# Patient Record
Sex: FEMALE | Race: WHITE | URBAN - METROPOLITAN AREA
[De-identification: names, ages, dates, MRNs, and addresses within clinical notes are randomized per-mention and may not be internally consistent; named-entity substitution may affect disease eponyms.]

---

## 2018-01-02 ENCOUNTER — HOSPITAL ENCOUNTER (OUTPATIENT)
Dept: PHYSICAL THERAPY | Age: 33
Discharge: HOME | End: 2018-01-02
Payer: COMMERCIAL

## 2018-01-02 PROCEDURE — 97110 THERAPEUTIC EXERCISES: CPT

## 2018-01-02 PROCEDURE — 97161 PT EVAL LOW COMPLEX 20 MIN: CPT

## 2018-01-02 NOTE — PROGRESS NOTES
Ambulatory/Rehab Services H2 Model Falls Risk Assessment    Risk Factor Pts. ·   Confusion/Disorientation/Impulsivity  []    4 ·   Symptomatic Depression  []   2 ·   Altered Elimination  []   1 ·   Dizziness/Vertigo  []   1 ·   Gender (Male)  []   1 ·   Any administered antiepileptics (anticonvulsants):  []   2 ·   Any administered benzodiazepines:  []   1 ·   Visual Impairment (specify):  []   1 ·   Portable Oxygen Use  []   1 ·   Orthostatic ? BP  []   1 ·   History of Recent Falls (within 3 mos.)  []   5     Ability to Rise from Chair (choose one) Pts. ·   Ability to rise in a single movement  [x]   0 ·   Pushes up, successful in one attempt  []   1 ·   Multiple attempts, but successful  []   3 ·   Unable to rise without assistance  []   4   Total: (5 or greater = High Risk) 0     Falls Prevention Plan:   []                Physical Limitations to Exercise (specify):   []                Mobility Assistance Device (type):   []                Exercise/Equipment Adaptation (specify):    ©2010 [facility] Patent #[id number].  Federal Law prohibits the replication, distribution or use without written permission from [facility] [employer]

## 2018-01-02 NOTE — THERAPY EVALUATION
Solis Blackburn  : 1985  Payor: 559Deepa Carvalho / Plan: [address] / Product Type: PPO /  [address] at [facility] [address]  [phone]   [phone]          OUTPATIENT PHYSICAL THERAPY:Initial Assessment and Daily Note 2018    ICD-10: Treatment Diagnosis: Pelvic Floor Dysfunction ( M62.89); Lack of coordination (R27.8); Spasm of muscle (Y48.687)  Precautions/Allergies:   Sulfa (sulfonamide antibiotics)   Fall Risk Score: 0 (? 5 = High Risk)  MD Orders: Evaluate and treat MEDICAL/REFERRING DIAGNOSIS:  Other specified disorders of muscle [M62.89]   DATE OF ONSET: Summer 2017  REFERRING PHYSICIAN: Stephen Schuster MD  RETURN PHYSICIAN APPOINTMENT: TBD     INITIAL ASSESSMENT:  Ms. Mariam Perales presents with moderate-significant pelvic floor muscle (PFM) over activity and lack of coordination, resulting in pain during intercourse. She also demonstrates weakness throughout her core musculature and pelvic girdle, further increasing tone in her PFM. This is contributing to her difficulty with wearing tampons and penile penetration. I believe she will benefit from skilled PT, with an emphasis on manual therapy and muscle retraining, strength, and coordination to improve her ability to perform sexual intercourse without pain or difficulty. She is pleasant, motivated and eager to return to pain free intercourse. PROBLEM LIST (Impacting functional limitations):  1. Decreased Strength  2. Increased Pain  3. Decreased [place] with Home Exercise Program INTERVENTIONS PLANNED:  1. Home Exercise Program (HEP)  2. Manual Therapy  3. Therapeutic Exercise/Strengthening   TREATMENT PLAN:  Effective Dates: 2018 TO 2018.   Frequency/Duration: Once every two weeks for 8 weeks (4 sessions total)    GOALS: (Goals have been discussed and agreed upon with patient.)    Short-Term Functional Goals: Time Frame: 4 weeks  Pt will demonstrate I with basic PFM HEP to improve awareness, coordination, and timing of PFM. Pt with demonstrate normal voluntary relaxation of the pelvic floor muscle group to improve pelvic floor ROM and tolerate pain free tampon use. Discharge Goals: Time Frame: 8 weeks  1. Pt will report < 2/10 discomfort during intercourse, by implementing lubrication, positioning and down training strategies as instructed by PT. 2. Pt will demonstrate I with advanced core stabilization and general mobility program to facilitate carry over and I management of symptoms. 3. Pt will demonstrate appropriate co contraction of the Transversus Abdominus and Pelvic Floor muscle group, and maintain x 60 seconds to improve core stability and return to dynamic physical activity such as 9 rounds, without increasing tension in the PFM. Rehabilitation Potential For Stated Goals: Good                The information in this section was collected on 1/2/2018 (except where otherwise noted). HISTORY:   History of Present Injury/Illness (Reason for Referral):  Pt reports onset of pain during intercourse after being  in Summer 2017. 0 pregnancies. Urinary: Denies urinary dysfunction, including dysuria, hesitancy or incontinence. Does state that there is pain with urination following penetrative intercourse. Bowel: Once every three days without pushing/straining. Sexual: Emilie Major 3 at this time, due to increasing pain with intercourse. Rated as 8/10, described as tight/stretching. Denies pain with gynecological examinations, but does state that wearing tampons is \"uncomfortable, like they are coming out all the time\". Pelvic Organ Prolapse/Pelvic Pain: Denies POP or pelvic girdle pain at this time. Past Medical History/Comorbidities:   Ms. Yessica Graves  has no past medical history on file. Ms. Yessica Graves  has no past surgical history on file. Social History/Living Environment:     Lives with . No physical barriers at this time.   Prior Level of Function/Work/Activity:  Teacher    Current Medications:       Current Outpatient Prescriptions:     tramadol-acetaminophen (ULTRACET) 37.5-325 mg per tablet, Take 1 Tab by mouth every six (6) hours as needed for Pain., Disp: 20 Tab, Rfl: 0    OMEPRAZOLE PO, Take 20 mg by mouth two (2) times a day., Disp: , Rfl:     ondansetron (ZOFRAN ODT) 8 mg disintegrating tablet, Take 8 mg by mouth every eight (8) hours as needed. , Disp: , Rfl:    Date Last Reviewed:  2018   Number of Personal Factors/Comorbidities that affect the Plan of Care: 0: LOW COMPLEXITY   EXAMINATION:   Palpation:          Via vaginal canal: R STP and ischiocavernosus tender to palpation (with slight pull/burn noted), as well as L ischiocavernosus. Pubococcygeus locally TTP with pressure and \"poking\", described as uncomfortable, as well as iliococcygeus B. All improved with Strain Counterstrain (SCS)  Strength:  Via vaginal canal- P: Power, E: Endurance, R: Repetitions, QF: Quick Flicks, TrA: Transverse Abdominus, DB: Diaphragmatic Breathing  P At least 3/5 with moderate delay in relaxation, but unable to fully assess due to overactivity. E   NT due to active tenderness in PFM   R   NT due to active tenderness in PFM   QF   NT due to active tenderness in PFM   TrA Strong, with strong PFM co contraction. DB Impaired, chest breathing;l however some descent of PFM noted. Coordination:          Via SEMG. Supine resting tone in hooklyin-7 mV, with PFM drops, 3-5 mV. Following adductor CTM < 1.2 mV     Body Structures Involved:  1. Muscles Body Functions Affected:  1. Neuromusculoskeletal  2. Movement Related Activities and Participation Affected:  1. Self Care  2. Interpersonal Interactions and Relationships   Number of elements (examined above) that affect the Plan of Care: 3: MODERATE COMPLEXITY   CLINICAL PRESENTATION:   Presentation: Stable and uncomplicated: LOW COMPLEXITY   CLINICAL DECISION MAKING:   Outcome Measure:    Tool Used: pain Disability index  Score:  Initial: 1/2/2018- 8/10 (Sexual Behavior) Most Recent: TBD   Interpretation of Score: The rating scale measures the degree to which aspects of your life are disrupted by chronic pain. For each of the 7 categories, patient circles the level of disability they experience 0 to 10. 0 signifies no disability at all, 10 signifies that these activities are totally disrupted by pain. Medical Necessity:   · Patient is expected to demonstrate progress in strength, coordination and functional technique to improve PFM function and eliminate dyspareunia. Reason for Services/Other Comments:  · Patient continues to require skilled intervention due to above mentioned deficits. Use of outcome tool(s) and clinical judgement create a POC that gives a: Clear prediction of patient's progress: LOW COMPLEXITY            TREATMENT:   (In addition to Assessment/Re-Assessment sessions the following treatments were rendered)    Pre-treatment Symptoms/Complaints:  Pt is eager and nervous to start PT. Pain: Initial:   Pain Intensity 1: 0/10 Post Session:  1/10 (mild discomfort vaginally, similar to postcoital pain)     THERAPEUTIC EXERCISE: (15 minutes):  Exercises per grid below to improve mobility and coordination. Required maximal visual, verbal and tactile cues to promote normal PFM function. Progressed range, repetitions and complexity of movement as indicated. Date:  1/2/2018     Activity/Exercise Parameters   Pelvic Floor Drops 10 minutes   Lubrication and Sexual Positioning 5 minutes   Home Exercise Program Drops 3 x daily     Treatment/Session Assessment:    · Response to Treatment:  Pt is an excellent candidate for skilled PT with improvements in PFM following first session. · Compliance with Program/Exercises: Will assess as treatment progresses. · Recommendations/Intent for next treatment session:  \"Next visit will focus on pelvic mobility, adductor flexibility, initaite dilators\".     Total Treatment Duration: Evaluation 35 minutes, Treatment 15 minutes, French/doff clothing 5 minutes  PT Patient Time In/Time Out  Time In: 1110  Time Out: Yaquelin 24, PT

## 2018-01-15 ENCOUNTER — HOSPITAL ENCOUNTER (OUTPATIENT)
Dept: PHYSICAL THERAPY | Age: 33
Discharge: HOME | End: 2018-01-15
Payer: COMMERCIAL

## 2018-01-15 PROCEDURE — 97140 MANUAL THERAPY 1/> REGIONS: CPT

## 2018-01-15 PROCEDURE — 97110 THERAPEUTIC EXERCISES: CPT

## 2018-01-15 NOTE — PROGRESS NOTES
Esha Getting  : 1985  Payor: Mavis Carvalho / Plan: [address] / Product Type: PPO /  [address] at [facility] [address]  [phone]   [phone]          OUTPATIENT PHYSICAL THERAPY:Daily Note 1/15/2018    ICD-10: Treatment Diagnosis: Pelvic Floor Dysfunction ( M62.89); Lack of coordination (R27.8); Spasm of muscle (G17.867)  Precautions/Allergies:   Sulfa (sulfonamide antibiotics)   Fall Risk Score: 0 (? 5 = High Risk)  MD Orders: Evaluate and treat MEDICAL/REFERRING DIAGNOSIS:  Other specified disorders of muscle [M62.89]   DATE OF ONSET: Summer 2017  REFERRING PHYSICIAN: Reina Bedolla MD  RETURN PHYSICIAN APPOINTMENT: TBD     INITIAL ASSESSMENT:  Ms. Kris Matamoros presents with moderate-significant pelvic floor muscle (PFM) over activity and lack of coordination, resulting in pain during intercourse. She also demonstrates weakness throughout her core musculature and pelvic girdle, further increasing tone in her PFM. This is contributing to her difficulty with wearing tampons and penile penetration. I believe she will benefit from skilled PT, with an emphasis on manual therapy and muscle retraining, strength, and coordination to improve her ability to perform sexual intercourse without pain or difficulty. She is pleasant, motivated and eager to return to pain free intercourse. PROBLEM LIST (Impacting functional limitations):  1. Decreased Strength  2. Increased Pain  3. Decreased [place] with Home Exercise Program INTERVENTIONS PLANNED:  1. Home Exercise Program (HEP)  2. Manual Therapy  3. Therapeutic Exercise/Strengthening   TREATMENT PLAN:  Effective Dates: 2018 TO 2018.   Frequency/Duration: Once every two weeks for 8 weeks (4 sessions total)    GOALS: (Goals have been discussed and agreed upon with patient.)    Short-Term Functional Goals: Time Frame: 4 weeks  Pt will demonstrate I with basic PFM HEP to improve awareness, coordination, and timing of PFM. Pt with demonstrate normal voluntary relaxation of the pelvic floor muscle group to improve pelvic floor ROM and tolerate pain free tampon use. Discharge Goals: Time Frame: 8 weeks  1. Pt will report < 2/10 discomfort during intercourse, by implementing lubrication, positioning and down training strategies as instructed by PT. 2. Pt will demonstrate I with advanced core stabilization and general mobility program to facilitate carry over and I management of symptoms. 3. Pt will demonstrate appropriate co contraction of the Transversus Abdominus and Pelvic Floor muscle group, and maintain x 60 seconds to improve core stability and return to dynamic physical activity such as 9 rounds, without increasing tension in the PFM. Rehabilitation Potential For Stated Goals: Good                The information in this section was collected on 1/15/2018 (except where otherwise noted). HISTORY:   History of Present Injury/Illness (Reason for Referral):  Pt reports onset of pain during intercourse after being  in Summer 2017. 0 pregnancies. Urinary: Denies urinary dysfunction, including dysuria, hesitancy or incontinence. Does state that there is pain with urination following penetrative intercourse. Bowel: Once every three days without pushing/straining. Sexual: Dewain Leeks 3 at this time, due to increasing pain with intercourse. Rated as 8/10, described as tight/stretching. Denies pain with gynecological examinations, but does state that wearing tampons is \"uncomfortable, like they are coming out all the time\". Pelvic Organ Prolapse/Pelvic Pain: Denies POP or pelvic girdle pain at this time. Past Medical History/Comorbidities:   Ms. Mariam Perales  has no past medical history on file. Ms. Mariam Perales  has no past surgical history on file. Social History/Living Environment:     Lives with . No physical barriers at this time.   Prior Level of Function/Work/Activity:  Teacher    Current Medications:       Current Outpatient Prescriptions:     tramadol-acetaminophen (ULTRACET) 37.5-325 mg per tablet, Take 1 Tab by mouth every six (6) hours as needed for Pain., Disp: 20 Tab, Rfl: 0    OMEPRAZOLE PO, Take 20 mg by mouth two (2) times a day., Disp: , Rfl:     ondansetron (ZOFRAN ODT) 8 mg disintegrating tablet, Take 8 mg by mouth every eight (8) hours as needed. , Disp: , Rfl:    Date Last Reviewed:  1/15/2018   Number of Personal Factors/Comorbidities that affect the Plan of Care: 0: LOW COMPLEXITY   EXAMINATION:   Palpation:          Via vaginal canal: R STP and ischiocavernosus tender to palpation (with slight pull/burn noted), as well as L ischiocavernosus. Pubococcygeus locally TTP with pressure and \"poking\", described as uncomfortable, as well as iliococcygeus B. All improved with Strain Counterstrain (SCS)  Strength:  Via vaginal canal- P: Power, E: Endurance, R: Repetitions, QF: Quick Flicks, TrA: Transverse Abdominus, DB: Diaphragmatic Breathing  P At least 3/5 with moderate delay in relaxation, but unable to fully assess due to overactivity. E   NT due to active tenderness in PFM   R   NT due to active tenderness in PFM   QF   NT due to active tenderness in PFM   TrA Strong, with strong PFM co contraction. DB Impaired, chest breathing;l however some descent of PFM noted. Coordination:          Via SEMG. Supine resting tone in hooklyin-7 mV, with PFM drops, 3-5 mV. Following adductor CTM < 1.2 mV     Body Structures Involved:  1. Muscles Body Functions Affected:  1. Neuromusculoskeletal  2. Movement Related Activities and Participation Affected:  1. Self Care  2. Interpersonal Interactions and Relationships   Number of elements (examined above) that affect the Plan of Care: 3: MODERATE COMPLEXITY   CLINICAL PRESENTATION:   Presentation: Stable and uncomplicated: LOW COMPLEXITY   CLINICAL DECISION MAKING:   Outcome Measure:    Tool Used: pain Disability index  Score:  Initial: 1/2/2018- 8/10 (Sexual Behavior) Most Recent: TBD   Interpretation of Score: The rating scale measures the degree to which aspects of your life are disrupted by chronic pain. For each of the 7 categories, patient circles the level of disability they experience 0 to 10. 0 signifies no disability at all, 10 signifies that these activities are totally disrupted by pain. Medical Necessity:   · Patient is expected to demonstrate progress in strength, coordination and functional technique to improve PFM function and eliminate dyspareunia. Reason for Services/Other Comments:  · Patient continues to require skilled intervention due to above mentioned deficits. Use of outcome tool(s) and clinical judgement create a POC that gives a: Clear prediction of patient's progress: LOW COMPLEXITY            TREATMENT:   (In addition to Assessment/Re-Assessment sessions the following treatments were rendered)    Pre-treatment Symptoms/Complaints:  No soreness noted following initial evaluation. Still having a hard time feeling those muscles down below, otherwise no complaints. Pain: Initial:   Pain Intensity 1: 0/10 Post Session: 0/10     THERAPEUTIC EXERCISE: (25 minutes):  Exercises per grid below to improve mobility and coordination. Required moderate visual, verbal and tactile cues to promote normal PFM function. Progressed range, repetitions and complexity of movement as indicated. Date:  1/15/2018     Activity/Exercise Parameters   Pelvic Floor Drops 12 minutes   Arousal Sheet Music, Female arousal continuum; 5 minutes   Dilators Rationale, Instructions; 5 minutes   Cat/Camel/Child's Pose 2 minutes   Home Exercise Program Drops 3 x daily, Cat/Camel/Child's pose 1 x daily; 1 minute     MANUAL THERAPY: (30 minutes): Soft tissue mobilization was utilized and necessary because of the patient's painful spasm and restricted motion of soft tissue.   (Used abbreviations: MET - muscle energy technique; SCS- Strain counter strain; CTM- Connective tissue mobilizations; CR- Contract/relax; SP- Sustained pressure, TrP- Trigger point release, IASTM- Instrument assisted soft tissue mobilizations, TDN- Trigger point dry needling). - SP, CR, and SCS utilized to PFM with 1 finger and small/medium dilator. F/B CTM to B adductor group. Treatment/Session Assessment:    · Response to Treatment:  Resting tone elevated 5-7 mV at start of session, but eventually WNL during internal. Mostly uncomfortable at superficial PFM and tolerated small vaginismus dilator without pain. Medium did illicit some reproduction of the need to urinate following intercourse. Significant emphasis on education on arousal and desire. · Compliance with Program/Exercises: Will assess as treatment progresses. · Recommendations/Intent for next treatment session: \"Next visit will focus on medium, large dilator, superficial emphasis\".     Total Treatment Duration: 55 minutes, French/doff clothing 5 minutes  PT Patient Time In/Time Out  Time In: 0800  Time Out: 0900    Mili Rockwell, PT

## 2018-01-16 ENCOUNTER — APPOINTMENT (OUTPATIENT)
Dept: PHYSICAL THERAPY | Age: 33
End: 2018-01-16
Payer: COMMERCIAL

## 2018-01-29 ENCOUNTER — HOSPITAL ENCOUNTER (OUTPATIENT)
Dept: PHYSICAL THERAPY | Age: 33
Discharge: HOME | End: 2018-01-29
Payer: COMMERCIAL

## 2018-01-29 NOTE — PROGRESS NOTES
Mansoor Merino  : 1985  Primary: [facility]  Secondary:  [address] at [address]  [phone]   [phone]          OUTPATIENT DAILY NOTE    NAME/AGE/GENDER: Mansoor Merino is a 34 y.o. female. DATE: 2018    SUBJECTIVE:  Today's appt cancelled due to therapist scheduling conflict. Will plan to follow up on next scheduled visit.     Ursula Macias PT,

## 2018-01-30 ENCOUNTER — APPOINTMENT (OUTPATIENT)
Dept: PHYSICAL THERAPY | Age: 33
End: 2018-01-30
Payer: COMMERCIAL

## 2018-01-31 ENCOUNTER — HOSPITAL ENCOUNTER (OUTPATIENT)
Dept: PHYSICAL THERAPY | Age: 33
Discharge: HOME | End: 2018-01-31
Payer: COMMERCIAL

## 2018-01-31 PROCEDURE — 97140 MANUAL THERAPY 1/> REGIONS: CPT

## 2018-01-31 PROCEDURE — 97110 THERAPEUTIC EXERCISES: CPT

## 2018-01-31 NOTE — PROGRESS NOTES
Rafi Tadeo  : 1985  [facility] / Plan: [address] / Product Type: PPO /  [address]  at [facility] [address]  [phone]   [phone]          OUTPATIENT PHYSICAL THERAPY:Daily Note 2018    ICD-10: Treatment Diagnosis: Pelvic Floor Dysfunction ( M62.89); Lack of coordination (R27.8); Spasm of muscle (R01.924)  Precautions/Allergies:   Sulfa (sulfonamide antibiotics)   Fall Risk Score: 0 (? 5 = High Risk)  MD Orders: Evaluate and treat MEDICAL/REFERRING DIAGNOSIS:  Other specified disorders of muscle [M62.89]   DATE OF ONSET: Summer 2017  REFERRING PHYSICIAN: Felicie Severe, MD  RETURN PHYSICIAN APPOINTMENT: TBD     INITIAL ASSESSMENT:  Ms. Chris Wright presents with moderate-significant pelvic floor muscle (PFM) over activity and lack of coordination, resulting in pain during intercourse. She also demonstrates weakness throughout her core musculature and pelvic girdle, further increasing tone in her PFM. This is contributing to her difficulty with wearing tampons and penile penetration. I believe she will benefit from skilled PT, with an emphasis on manual therapy and muscle retraining, strength, and coordination to improve her ability to perform sexual intercourse without pain or difficulty. She is pleasant, motivated and eager to return to pain free intercourse. PROBLEM LIST (Impacting functional limitations):  1. Decreased Strength  2. Increased Pain  3. Decreased [place] with Home Exercise Program INTERVENTIONS PLANNED:  1. Home Exercise Program (HEP)  2. Manual Therapy  3. Therapeutic Exercise/Strengthening   TREATMENT PLAN:  Effective Dates: 2018 TO 2018.   Frequency/Duration: Once every two weeks for 8 weeks (4 sessions total)    GOALS: (Goals have been discussed and agreed upon with patient.)    Short-Term Functional Goals: Time Frame: 4 weeks  Pt will demonstrate I with basic PFM HEP to improve awareness, coordination, and timing of PFM. (MET 1/31/2018)  Pt with demonstrate normal voluntary relaxation of the pelvic floor muscle group to improve pelvic floor ROM and tolerate pain free tampon use. Discharge Goals: Time Frame: 8 weeks  1. Pt will report < 2/10 discomfort during intercourse, by implementing lubrication, positioning and down training strategies as instructed by PT. 2. Pt will demonstrate I with advanced core stabilization and general mobility program to facilitate carry over and I management of symptoms. 3. Pt will demonstrate appropriate co contraction of the Transversus Abdominus and Pelvic Floor muscle group, and maintain x 60 seconds to improve core stability and return to dynamic physical activity such as 9 rounds, without increasing tension in the PFM. Rehabilitation Potential For Stated Goals: Good                The information in this section was collected on 1/31/2018 (except where otherwise noted). HISTORY:   History of Present Injury/Illness (Reason for Referral):  Pt reports onset of pain during intercourse after being  in Summer 2017. 0 pregnancies. Urinary: Denies urinary dysfunction, including dysuria, hesitancy or incontinence. Does state that there is pain with urination following penetrative intercourse. Bowel: Once every three days without pushing/straining. Sexual: Marcos Saleem 3 at this time, due to increasing pain with intercourse. Rated as 8/10, described as tight/stretching. Denies pain with gynecological examinations, but does state that wearing tampons is \"uncomfortable, like they are coming out all the time\". Pelvic Organ Prolapse/Pelvic Pain: Denies POP or pelvic girdle pain at this time. Past Medical History/Comorbidities:   Ms. Whitley Martinez  has no past medical history on file. Ms. Whitley Martinez  has no past surgical history on file. Social History/Living Environment:     Lives with . No physical barriers at this time.   Prior Level of Function/Work/Activity:  Teacher    Current Medications:       Current Outpatient Prescriptions:     tramadol-acetaminophen (ULTRACET) 37.5-325 mg per tablet, Take 1 Tab by mouth every six (6) hours as needed for Pain., Disp: 20 Tab, Rfl: 0    OMEPRAZOLE PO, Take 20 mg by mouth two (2) times a day., Disp: , Rfl:     ondansetron (ZOFRAN ODT) 8 mg disintegrating tablet, Take 8 mg by mouth every eight (8) hours as needed. , Disp: , Rfl:    Date Last Reviewed:  2018   Number of Personal Factors/Comorbidities that affect the Plan of Care: 0: LOW COMPLEXITY   EXAMINATION:   Palpation:          Via vaginal canal: R STP and ischiocavernosus tender to palpation (with slight pull/burn noted), as well as L ischiocavernosus. Pubococcygeus locally TTP with pressure and \"poking\", described as uncomfortable, as well as iliococcygeus B. All improved with Strain Counterstrain (SCS)  Strength:  Via vaginal canal- P: Power, E: Endurance, R: Repetitions, QF: Quick Flicks, TrA: Transverse Abdominus, DB: Diaphragmatic Breathing  P At least 3/5 with moderate delay in relaxation, but unable to fully assess due to overactivity. E   NT due to active tenderness in PFM   R   NT due to active tenderness in PFM   QF   NT due to active tenderness in PFM   TrA Strong, with strong PFM co contraction. DB Impaired, chest breathing;l however some descent of PFM noted. Coordination:          Via SEMG. Supine resting tone in hooklyin-7 mV, with PFM drops, 3-5 mV. Following adductor CTM < 1.2 mV     Body Structures Involved:  1. Muscles Body Functions Affected:  1. Neuromusculoskeletal  2. Movement Related Activities and Participation Affected:  1. Self Care  2. Interpersonal Interactions and Relationships   Number of elements (examined above) that affect the Plan of Care: 3: MODERATE COMPLEXITY   CLINICAL PRESENTATION:   Presentation: Stable and uncomplicated: LOW COMPLEXITY   CLINICAL DECISION MAKING:   Outcome Measure:    Tool Used: pain Disability index  Score:  Initial: 1/2/2018- 8/10 (Sexual Behavior) Most Recent: TBD   Interpretation of Score: The rating scale measures the degree to which aspects of your life are disrupted by chronic pain. For each of the 7 categories, patient circles the level of disability they experience 0 to 10. 0 signifies no disability at all, 10 signifies that these activities are totally disrupted by pain. Medical Necessity:   · Patient is expected to demonstrate progress in strength, coordination and functional technique to improve PFM function and eliminate dyspareunia. Reason for Services/Other Comments:  · Patient continues to require skilled intervention due to above mentioned deficits. Use of outcome tool(s) and clinical judgement create a POC that gives a: Clear prediction of patient's progress: LOW COMPLEXITY            TREATMENT:   (In addition to Assessment/Re-Assessment sessions the following treatments were rendered)    Pre-treatment Symptoms/Complaints:  No pain following last session. Did have intercourse and pt did not report deep pain, however superficial burning near the urethra. Did resolve as intercourse continued, but not comfortable. Has not purchased dilators at this time, but did start reading Sheet Music. Pain: Initial:   Pain Intensity 1: 0/10 Post Session: 0/10     THERAPEUTIC EXERCISE: (25 minutes):  Exercises per grid below to improve mobility and coordination. Required moderate visual, verbal and tactile cues to promote normal PFM function. Progressed range, repetitions and complexity of movement as indicated. Date:  1/31/2018     Activity/Exercise Parameters   Pelvic Floor Drops 10 minutes   Arousal/Desrire Education 5 minutes   Dilators/Self Stretch Review, Instructions; 5 minutes   Happy Baby 2 minutes   Adductor stretch 2 minutes   Home Exercise Program Drops 3 x daily, Cat/Camel/Child's pose 1 x daily; 1 minute     MANUAL THERAPY: (30 minutes):  Soft tissue mobilization was utilized and necessary because of the patient's painful spasm and restricted motion of soft tissue. (Used abbreviations: MET - muscle energy technique; SCS- Strain counter strain; CTM- Connective tissue mobilizations; CR- Contract/relax; SP- Sustained pressure, TrP- Trigger point release, IASTM- Instrument assisted soft tissue mobilizations, TDN- Trigger point dry needling). - SP, CR, and SCS utilized to PFM with 1 finger and large dilator.   - CTM and IASTM to B adductor group. Treatment/Session Assessment:    · Response to Treatment:  Qtip test 1 at 1:00, as well as tightness and discomfort at L ischiocavernosus. Improved with MT. Deep PFM nontender this date and utilized large dilator without resistance, but mild discomfort. Progressing very well and nearing achievement of DTG 1. Discussed self options to treat PFM, including dilators or 1 finger stretch. · Compliance with Program/Exercises: Complaint with drops at this time. Achieved STG 1     · Recommendations/Intent for next treatment session: \"Next visit will focus on Large/XL dilator, superficial emphasis\".     Total Treatment Duration: 55 minutes, French/doff clothing 5 minutes  PT Patient Time In/Time Out  Time In: 2773  Time Out: [facility],

## 2018-02-12 ENCOUNTER — HOSPITAL ENCOUNTER (OUTPATIENT)
Dept: PHYSICAL THERAPY | Age: 33
Discharge: HOME | End: 2018-02-12
Payer: COMMERCIAL

## 2018-02-12 PROCEDURE — 97140 MANUAL THERAPY 1/> REGIONS: CPT

## 2018-02-12 PROCEDURE — 97110 THERAPEUTIC EXERCISES: CPT

## 2018-02-12 NOTE — PROGRESS NOTES
Adria Bolton  : 1985  Payor: Mavis Carvalho / Plan: [address] / Product Type: PPO /  [address] at [address]  [phone]   [phone]          OUTPATIENT PHYSICAL THERAPY:Daily Note 2018    ICD-10: Treatment Diagnosis: Pelvic Floor Dysfunction ( M62.89); Lack of coordination (R27.8); Spasm of muscle (D15.821)  Precautions/Allergies:   Sulfa (sulfonamide antibiotics)   Fall Risk Score: 0 (? 5 = High Risk)  MD Orders: Evaluate and treat MEDICAL/REFERRING DIAGNOSIS:  Other specified disorders of muscle [M62.89]   DATE OF ONSET: Summer 2017  REFERRING PHYSICIAN: Jeancarlos Garcia MD  RETURN PHYSICIAN APPOINTMENT: TBD     INITIAL ASSESSMENT:  Ms. Mili Adamson presents with moderate-significant pelvic floor muscle (PFM) over activity and lack of coordination, resulting in pain during intercourse. She also demonstrates weakness throughout her core musculature and pelvic girdle, further increasing tone in her PFM. This is contributing to her difficulty with wearing tampons and penile penetration. I believe she will benefit from skilled PT, with an emphasis on manual therapy and muscle retraining, strength, and coordination to improve her ability to perform sexual intercourse without pain or difficulty. She is pleasant, motivated and eager to return to pain free intercourse. PROBLEM LIST (Impacting functional limitations):  1. Decreased Strength  2. Increased Pain  3. Decreased [place] with Home Exercise Program INTERVENTIONS PLANNED:  1. Home Exercise Program (HEP)  2. Manual Therapy  3. Therapeutic Exercise/Strengthening   TREATMENT PLAN:  Effective Dates: 2018 TO 2018.   Frequency/Duration: Once every two weeks for 8 weeks (4 sessions total)    GOALS: (Goals have been discussed and agreed upon with patient.)    Short-Term Functional Goals: Time Frame: 4 weeks  Pt will demonstrate I with basic PFM HEP to improve awareness, coordination, and timing of PFM. (MET 1/31/2018)  Pt with demonstrate normal voluntary relaxation of the pelvic floor muscle group to improve pelvic floor ROM and tolerate pain free tampon use. (MET 2/12/2018)    Discharge Goals: Time Frame: 8 weeks  1. Pt will report < 2/10 discomfort during intercourse, by implementing lubrication, positioning and down training strategies as instructed by PT. 2. Pt will demonstrate I with advanced core stabilization and general mobility program to facilitate carry over and I management of symptoms. 3. Pt will demonstrate appropriate co contraction of the Transversus Abdominus and Pelvic Floor muscle group, and maintain x 60 seconds to improve core stability and return to dynamic physical activity such as 9 rounds, without increasing tension in the PFM. Rehabilitation Potential For Stated Goals: Good                The information in this section was collected on 2/12/2018 (except where otherwise noted). HISTORY:   History of Present Injury/Illness (Reason for Referral):  Pt reports onset of pain during intercourse after being  in Summer 2017. 0 pregnancies. Urinary: Denies urinary dysfunction, including dysuria, hesitancy or incontinence. Does state that there is pain with urination following penetrative intercourse. Bowel: Once every three days without pushing/straining. Sexual: J Luis rPince 3 at this time, due to increasing pain with intercourse. Rated as 8/10, described as tight/stretching. Denies pain with gynecological examinations, but does state that wearing tampons is \"uncomfortable, like they are coming out all the time\". Pelvic Organ Prolapse/Pelvic Pain: Denies POP or pelvic girdle pain at this time. Past Medical History/Comorbidities:   Ms. Jang Courser  has no past medical history on file. Ms. Jang Coursemarek  has no past surgical history on file. Social History/Living Environment:     Lives with . No physical barriers at this time.   Prior Level of Function/Work/Activity:  Teacher    Current Medications:       Current Outpatient Prescriptions:     tramadol-acetaminophen (ULTRACET) 37.5-325 mg per tablet, Take 1 Tab by mouth every six (6) hours as needed for Pain., Disp: 20 Tab, Rfl: 0    OMEPRAZOLE PO, Take 20 mg by mouth two (2) times a day., Disp: , Rfl:     ondansetron (ZOFRAN ODT) 8 mg disintegrating tablet, Take 8 mg by mouth every eight (8) hours as needed. , Disp: , Rfl:    Date Last Reviewed:  2018   Number of Personal Factors/Comorbidities that affect the Plan of Care: 0: LOW COMPLEXITY   EXAMINATION:   Palpation:          Via vaginal canal: R STP and ischiocavernosus tender to palpation (with slight pull/burn noted), as well as L ischiocavernosus. Pubococcygeus locally TTP with pressure and \"poking\", described as uncomfortable, as well as iliococcygeus B. All improved with Strain Counterstrain (SCS)  Strength:  Via vaginal canal- P: Power, E: Endurance, R: Repetitions, QF: Quick Flicks, TrA: Transverse Abdominus, DB: Diaphragmatic Breathing  P At least 3/5 with moderate delay in relaxation, but unable to fully assess due to overactivity. E   NT due to active tenderness in PFM   R   NT due to active tenderness in PFM   QF   NT due to active tenderness in PFM   TrA Strong, with strong PFM co contraction. DB Impaired, chest breathing;l however some descent of PFM noted. Coordination:          Via SEMG. Supine resting tone in hooklyin-7 mV, with PFM drops, 3-5 mV. Following adductor CTM < 1.2 mV     Body Structures Involved:  1. Muscles Body Functions Affected:  1. Neuromusculoskeletal  2. Movement Related Activities and Participation Affected:  1. Self Care  2.  Interpersonal Interactions and Relationships   Number of elements (examined above) that affect the Plan of Care: 3: MODERATE COMPLEXITY   CLINICAL PRESENTATION:   Presentation: Stable and uncomplicated: LOW COMPLEXITY   CLINICAL DECISION MAKING:   Outcome Measure: Tool Used: pain Disability index  Score:  Initial: 1/2/2018- 8/10 (Sexual Behavior) Most Recent: TBD   Interpretation of Score: The rating scale measures the degree to which aspects of your life are disrupted by chronic pain. For each of the 7 categories, patient circles the level of disability they experience 0 to 10. 0 signifies no disability at all, 10 signifies that these activities are totally disrupted by pain. Medical Necessity:   · Patient is expected to demonstrate progress in strength, coordination and functional technique to improve PFM function and eliminate dyspareunia. Reason for Services/Other Comments:  · Patient continues to require skilled intervention due to above mentioned deficits. Use of outcome tool(s) and clinical judgement create a POC that gives a: Clear prediction of patient's progress: LOW COMPLEXITY            TREATMENT:   (In addition to Assessment/Re-Assessment sessions the following treatments were rendered)    Pre-treatment Symptoms/Complaints:  Sick within the past two weeks, but did try to have intercourse. Rated as 6/10 and not superficial. This felt like deep mm. Pain, that she could not relax. Pain: Initial:   Pain Intensity 1: 0/10 Post Session: 0/10     THERAPEUTIC EXERCISE: (40 minutes):  Exercises per grid below to improve mobility and coordination. Required minimal visual, verbal and tactile cues to promote normal PFM function. Progressed range, repetitions and complexity of movement as indicated. Date:  2/12/2018     Activity/Exercise Parameters   Pelvic Floor Drops 10 minutes (w/semg, in supine)   Arousal/Desrire Education 4 minutes   Dilators/Self Stretch Proper performance, Mod A w/ large dilator; 25 minutes   Happy Baby    Adductor stretch    Home Exercise Program Drops 3 x daily, Cat/Camel/Child's pose 1 x daily, Add Dilators; 1 minute     MANUAL THERAPY: (15 minutes):  Soft tissue mobilization was utilized and necessary because of the patient's painful spasm and restricted motion of soft tissue. (Used abbreviations: MET - muscle energy technique; SCS- Strain counter strain; CTM- Connective tissue mobilizations; CR- Contract/relax; SP- Sustained pressure, TrP- Trigger point release, IASTM- Instrument assisted soft tissue mobilizations, TDN- Trigger point dry needling). - SP, CR, and SCS utilized to PFM with 1 finger, large and XL dilator. Treatment/Session Assessment:    · Response to Treatment:  No pain with 1 finger, L or Xl dilator. Significant emphasis on self performance to maintain gains that are being made in PT. Patient is progressing well, and I believe she is starting to understand the role her mind/values/beliefs are playing on her tension/tightness during intercourse. Patient has achieved STG 2 at this time, demonstrating normal voluntary relaxation at start of session. · Compliance with Program/Exercises: Complaint with drops at this time. · Recommendations/Intent for next treatment session: \"Next visit will focus on Large/XL dilator, superficial emphasis\".     Total Treatment Duration: 55 minutes, French/doff clothing 5 minutes  PT Patient Time In/Time Out  Time In: 7159  Time Out: [address], PT

## 2018-02-13 ENCOUNTER — APPOINTMENT (OUTPATIENT)
Dept: PHYSICAL THERAPY | Age: 33
End: 2018-02-13
Payer: COMMERCIAL

## 2018-02-26 ENCOUNTER — APPOINTMENT (OUTPATIENT)
Dept: PHYSICAL THERAPY | Age: 33
End: 2018-02-26
Payer: COMMERCIAL

## 2018-02-27 ENCOUNTER — APPOINTMENT (OUTPATIENT)
Dept: PHYSICAL THERAPY | Age: 33
End: 2018-02-27
Payer: COMMERCIAL

## 2018-03-07 ENCOUNTER — HOSPITAL ENCOUNTER (OUTPATIENT)
Dept: PHYSICAL THERAPY | Age: 33
Discharge: HOME | End: 2018-03-07
Payer: COMMERCIAL

## 2018-03-07 PROCEDURE — 97140 MANUAL THERAPY 1/> REGIONS: CPT

## 2018-03-07 PROCEDURE — 97110 THERAPEUTIC EXERCISES: CPT

## 2018-03-07 NOTE — THERAPY RECERTIFICATION
[place]  : 1985  Payor: Mavis Carvalho / Plan: [id number] / Product Type: PPO /  [address]  at [facility]  [address] [place] [address], [address] [place] [address]  [phone]   [phone]          OUTPATIENT PHYSICAL THERAPY:Daily Note, Re-evaluation and Recertification [id number]    ICD-10: Treatment Diagnosis: Pelvic Floor Dysfunction ( M62.89); Lack of coordination (R27.8); Spasm of muscle (R81.961)  Precautions/Allergies:   Sulfa (sulfonamide antibiotics)   Fall Risk Score: 0 (? 5 = High Risk)  MD Orders: Evaluate and treat MEDICAL/REFERRING DIAGNOSIS:  Other specified disorders of muscle [M62.89]   DATE OF ONSET: Summer 2017  REFERRING PHYSICIAN: Augustine Nicolas MD  RETURN PHYSICIAN APPOINTMENT: TBD     RE-EVALUATION/RE-CERTIFICATION ASSESSMENT 3/7/2018: Jessica Caba has been seen for a total of 5 PT sessions with an emphasis on internal and external manual therapy (MT), as well as biofeedback, pelvic floor down training and gentle mobility/flexibility. She has made great gains in re: to her tolerance for internal interventions, even tolerating the XL dilator with < 3/10 pain. She also has improved her ability to voluntarily relax her PFM group, which has also improved her tolerance for attempts at penetrative intercourse. She has had sex 3-4 times since starting PT and does continue to report pain, though the severity has much improved. She believes that a large part of her pain during intercourse can be attributed to her lack of desire/arousal and fear/guarding. Because of this, I think she will benefit from continued PT for internal MT to improve PFM ROM, but also desensitization, but 1 x monthly. She also has been provided with other resources for desire/arousal/psychological component. She is pleasant and motivated, but limitations do include her low libido/desire for intercourse.      INITIAL ASSESSMENT:  Ms. Candi Brand presents with moderate-significant pelvic floor muscle (PFM) over activity and lack of coordination, resulting in pain during intercourse. She also demonstrates weakness throughout her core musculature and pelvic girdle, further increasing tone in her PFM. This is contributing to her difficulty with wearing tampons and penile penetration. I believe she will benefit from skilled PT, with an emphasis on manual therapy and muscle retraining, strength, and coordination to improve her ability to perform sexual intercourse without pain or difficulty. She is pleasant, motivated and eager to return to pain free intercourse. PROBLEM LIST (Impacting functional limitations):  1. Decreased Strength  2. Increased Pain  3. Decreased [place] with Home Exercise Program INTERVENTIONS PLANNED:  1. Home Exercise Program (HEP)  2. Manual Therapy  3. Therapeutic Exercise/Strengthening   TREATMENT PLAN:  Effective Dates: 3/7/2018 TO 5/30/2018. Frequency/Duration: 1 x monthly for 3 months    GOALS: (Goals have been discussed and agreed upon with patient.)    Short-Term Functional Goals:   Pt will demonstrate I with basic PFM HEP to improve awareness, coordination, and timing of PFM. (MET 1/31/2018)  Pt with demonstrate normal voluntary relaxation of the pelvic floor muscle group to improve pelvic floor ROM and tolerate pain free tampon use. (MET 2/12/2018)    Discharge Goals: Time Frame: 12 weeks  1. Pt will report < 2/10 discomfort during intercourse, by implementing lubrication, positioning and down training strategies as instructed by PT. (ONGOING)  2. Pt will demonstrate I with advanced core stabilization and general mobility program to facilitate carry over and I management of symptoms. (ONGOING)  3. Pt will demonstrate appropriate co contraction of the Transversus Abdominus and Pelvic Floor muscle group, and maintain x 60 seconds to improve core stability and return to dynamic physical activity such as 9 rounds, without increasing tension in the PFM. (ONGOING)  4.  Pt will tolerate XL dilator with 0/10 discomfort or pain to initiate painfree intercourse. (NEW)    Rehabilitation Potential For Stated Goals: Good    Regarding Tej Villaseñor therapy, I certify that the treatment plan above will be carried out by a therapist or under their direction. Thank you for this referral,  Maximo Saleh, PT       Referring Physician Signature: Errol Simpson MD              Date                       The information in this section was collected on 3/7/2018 (except where otherwise noted). HISTORY:   History of Present Injury/Illness (Reason for Referral):  Pt reports onset of pain during intercourse after being  in Summer 2017. 0 pregnancies. Urinary: Denies urinary dysfunction, including dysuria, hesitancy or incontinence. Does state that there is pain with urination following penetrative intercourse. Bowel: Once every three days without pushing/straining. Sexual: Debragal Merino 3 at this time, due to increasing pain with intercourse. Rated as 8/10, described as tight/stretching. Denies pain with gynecological examinations, but does state that wearing tampons is \"uncomfortable, like they are coming out all the time\". Pelvic Organ Prolapse/Pelvic Pain: Denies POP or pelvic girdle pain at this time. Re-Evaluation/Re-Certification 7/6/4887: Improvements in tolerance for penile penetration, with 5/10 or less pain during intercourse. Pain has varied between superficial and deep, but more recently has been deep tightness. Collin 2. Past Medical History/Comorbidities:   Ms. Georgina Jacobo  has no past medical history on file. Ms. Georgina Jacobo  has no past surgical history on file. Social History/Living Environment:     Lives with . No physical barriers at this time.   Prior Level of Function/Work/Activity:  Teacher    Current Medications:       Current Outpatient Prescriptions:     tramadol-acetaminophen (ULTRACET) 37.5-325 mg per tablet, Take 1 Tab by mouth every six (6) hours as needed for Pain., Disp: 20 Tab, Rfl: 0    OMEPRAZOLE PO, Take 20 mg by mouth two (2) times a day., Disp: , Rfl:     ondansetron (ZOFRAN ODT) 8 mg disintegrating tablet, Take 8 mg by mouth every eight (8) hours as needed. , Disp: , Rfl:    Date Last Reviewed:  3/7/2018   Number of Personal Factors/Comorbidities that affect the Plan of Care: 0: LOW COMPLEXITY   EXAMINATION:   UPDATED 3/7/2018:    Palpation:          Via vaginal canal: Nontender superficial and deep PFM, however, slight burning (short lived) with initiation of stretch to second layer on L. Tolerated L dilator with no pain and XL with slight discomfort/fullness (<2/10)    Strength:  Via vaginal canal- P: Power, E: Endurance, R: Repetitions, QF: Quick Flicks, TrA: Transverse Abdominus, DB: Diaphragmatic Breathing  P At least 5/5 with mild delay in relaxation   E   Not Applicable, due to tendency towards overactivity   R   Not Applicable, due to tendency towards overactivity   QF  Not Applicable, due to tendency towards overactivity   TrA Strong, with strong PFM co contraction. DB Improved, with belly expansion some descent of PFM noted. Coordination:          Via SEMG. Supine resting tone in hooklyin-3 mV     Body Structures Involved:  1. Muscles Body Functions Affected:  1. Neuromusculoskeletal  2. Movement Related Activities and Participation Affected:  1. Self Care  2. Interpersonal Interactions and Relationships   Number of elements (examined above) that affect the Plan of Care: 3: MODERATE COMPLEXITY   CLINICAL PRESENTATION:   Presentation: Stable and uncomplicated: LOW COMPLEXITY   CLINICAL DECISION MAKING:   Outcome Measure: Tool Used: pain Disability index  Score:  Initial: 2018- 8/10 (Sexual Behavior) Most Recent: 3/7/2018- 5/10 (Sexual Behavior)   Interpretation of Score: The rating scale measures the degree to which aspects of your life are disrupted by chronic pain.  For each of the 7 categories, patient circles the level of disability they experience 0 to 10. 0 signifies no disability at all, 10 signifies that these activities are totally disrupted by pain. Medical Necessity:   · Patient is expected to demonstrate progress in strength, coordination and functional technique to improve PFM function and eliminate dyspareunia. Reason for Services/Other Comments:  · Patient continues to require skilled intervention due to above mentioned deficits. Use of outcome tool(s) and clinical judgement create a POC that gives a: Clear prediction of patient's progress: LOW COMPLEXITY            TREATMENT:   (In addition to Assessment/Re-Assessment sessions the following treatments were rendered)    Pre-treatment Symptoms/Complaints:  Has had sex once in the past 3 weeks. Mostly deep discomfort. Feels like a lot of this may be \"in my head\". But no superficial burning or stinging to report    Pain: Initial:   Pain Intensity 1: 0/10 Post Session: 0/10     THERAPEUTIC EXERCISE: (25 minutes):  Exercises per grid below to improve mobility and coordination. Required minimal visual, verbal and tactile cues to promote normal PFM function. Progressed range, repetitions and complexity of movement as indicated. Date:  3/7/2018     Activity/Exercise Parameters   Pelvic Floor Drops Supine; 5 minutes   Counseling/Resources for Low Libido 5 minutes   Dilators/Self Stretch Proper performance, Min A w/ large dilator; 15 minutes   Happy Baby    Adductor stretch    Home Exercise Program Discussed during MT; Drops 3 x daily, Cat/Camel/Child's pose 1 x daily, Add Dilators     MANUAL THERAPY: (30 minutes): Soft tissue mobilization was utilized and necessary because of the patient's painful spasm and restricted motion of soft tissue.   (Used abbreviations: MET - muscle energy technique; SCS- Strain counter strain; CTM- Connective tissue mobilizations; CR- Contract/relax; SP- Sustained pressure, TrP- Trigger point release, IASTM- Instrument assisted soft tissue mobilizations, TDN- Trigger point dry needling). - SP, CR, and SCS utilized to PFM with 1 finger, large and XL dilator.   - CTM to adductor group B      Treatment/Session Assessment:    · Response to Treatment:  Pt continues to be a good candidate for skilled PT.     · Compliance with Program/Exercises: Complaint with drops & dilator at this time. · Recommendations/Intent for next treatment session: \"Next visit will focus on Large/XL dilator, Q tip test\".     Total Treatment Duration: 55 minutes, French/doff clothing 5 minutes  PT Patient Time In/Time Out  Time In: 4623  Time Out: [address], PT

## 2018-04-02 NOTE — THERAPY DISCHARGE
Sj Tovar  : 1985  Payor: [id number] / Plan: [id number] / Product Type: PPO /  [address] at [facility], [address] [place] [address]  [phone]   [phone]          OUTPATIENT PHYSICAL THERAPY:Discharge 3/7/2018    ICD-10: Treatment Diagnosis: Pelvic Floor Dysfunction ( M62.89); Lack of coordination (R27.8); Spasm of muscle (V21.001)  Precautions/Allergies:   Sulfa (sulfonamide antibiotics)   Fall Risk Score: 0 (? 5 = High Risk)  MD Orders: Evaluate and treat MEDICAL/REFERRING DIAGNOSIS:  Other specified disorders of muscle [M62.89]   DATE OF ONSET: Summer 2017  REFERRING PHYSICIAN: Erik Dutton MD  RETURN PHYSICIAN APPOINTMENT: TBD     Discharge 2018: Edwin Vicente was seen for 5 visits and was making great gains with her PFM mobility, relaxation, and attempts at intercourse. Upon speaking with her via email, she feels her muscles dysfunction has improved greatly and that her pain during intercourse may be more related to anxiety. She is pleased with the course of PT she attended and would not like to continue at this time. RE-EVALUATION/RE-CERTIFICATION ASSESSMENT 3/7/2018: Edwin Vicente has been seen for a total of 5 PT sessions with an emphasis on internal and external manual therapy (MT), as well as biofeedback, pelvic floor down training and gentle mobility/flexibility. She has made great gains in re: to her tolerance for internal interventions, even tolerating the XL dilator with < 3/10 pain. She also has improved her ability to voluntarily relax her PFM group, which has also improved her tolerance for attempts at penetrative intercourse. She has had sex 3-4 times since starting PT and does continue to report pain, though the severity has much improved. She believes that a large part of her pain during intercourse can be attributed to her lack of desire/arousal and fear/guarding.  Because of this, I think she will benefit from continued PT for internal MT to improve PFM ROM, but also desensitization, but 1 x monthly. She also has been provided with other resources for desire/arousal/psychological component. She is pleasant and motivated, but limitations do include her low libido/desire for intercourse. INITIAL ASSESSMENT:  Ms. Roseline Morgan presents with moderate-significant pelvic floor muscle (PFM) over activity and lack of coordination, resulting in pain during intercourse. She also demonstrates weakness throughout her core musculature and pelvic girdle, further increasing tone in her PFM. This is contributing to her difficulty with wearing tampons and penile penetration. I believe she will benefit from skilled PT, with an emphasis on manual therapy and muscle retraining, strength, and coordination to improve her ability to perform sexual intercourse without pain or difficulty. She is pleasant, motivated and eager to return to pain free intercourse. PROBLEM LIST (Impacting functional limitations):  1. Decreased Strength  2. Increased Pain  3. Decreased [place] with Home Exercise Program INTERVENTIONS PLANNED:  1. Home Exercise Program (HEP)  2. Manual Therapy  3. Therapeutic Exercise/Strengthening   TREATMENT PLAN:  Effective Dates: 3/7/2018 TO 5/30/2018. Frequency/Duration: 1 x monthly for 3 months    GOALS: (Goals have been discussed and agreed upon with patient.)    Short-Term Functional Goals:   Pt will demonstrate I with basic PFM HEP to improve awareness, coordination, and timing of PFM. (MET 1/31/2018)  Pt with demonstrate normal voluntary relaxation of the pelvic floor muscle group to improve pelvic floor ROM and tolerate pain free tampon use. (MET 2/12/2018)    Discharge Goals: Time Frame: 12 weeks  1. Pt will report < 2/10 discomfort during intercourse, by implementing lubrication, positioning and down training strategies as instructed by PT. (ONGOING)  2.  Pt will demonstrate I with advanced core stabilization and general mobility program to facilitate carry over and I management of symptoms. (ONGOING)  3. Pt will demonstrate appropriate co contraction of the Transversus Abdominus and Pelvic Floor muscle group, and maintain x 60 seconds to improve core stability and return to dynamic physical activity such as 9 rounds, without increasing tension in the PFM.  (ONGOING)    Rehabilitation Potential For Stated Goals: Good